# Patient Record
Sex: FEMALE | Race: WHITE | NOT HISPANIC OR LATINO | Employment: OTHER | URBAN - NONMETROPOLITAN AREA
[De-identification: names, ages, dates, MRNs, and addresses within clinical notes are randomized per-mention and may not be internally consistent; named-entity substitution may affect disease eponyms.]

---

## 2023-04-26 LAB
APPEARANCE, URINE: ABNORMAL
BACTERIA, URINE: ABNORMAL /HPF
BILIRUBIN, URINE: NEGATIVE
BLOOD, URINE: NEGATIVE
CALCIUM OXALATE CRYSTALS, URINE: ABNORMAL /HPF
COLOR, URINE: YELLOW
GLUCOSE, URINE: NEGATIVE MG/DL
KETONES, URINE: NEGATIVE MG/DL
LEUKOCYTE ESTERASE, URINE: ABNORMAL
MUCUS, URINE: ABNORMAL /LPF
NITRITE, URINE: NEGATIVE
PH, URINE: 6 (ref 5–8)
PROTEIN, URINE: NEGATIVE MG/DL
RBC, URINE: 2 /HPF (ref 0–5)
SPECIFIC GRAVITY, URINE: 1.02 (ref 1–1.03)
SQUAMOUS EPITHELIAL CELLS, URINE: 2 /HPF
UROBILINOGEN, URINE: <2 MG/DL (ref 0–1.9)
WBC CLUMPS, URINE: ABNORMAL /HPF
WBC, URINE: 24 /HPF (ref 0–5)

## 2023-06-21 LAB
APPEARANCE, URINE: CLEAR
BACTERIA, URINE: ABNORMAL /HPF
BILIRUBIN, URINE: NEGATIVE
BLOOD, URINE: NEGATIVE
COLOR, URINE: ABNORMAL
GLUCOSE, URINE: NEGATIVE MG/DL
KETONES, URINE: NEGATIVE MG/DL
LEUKOCYTE ESTERASE, URINE: NEGATIVE
NITRITE, URINE: POSITIVE
PH, URINE: 7 (ref 5–8)
PROTEIN, URINE: NEGATIVE MG/DL
RBC, URINE: 2 /HPF (ref 0–5)
SPECIFIC GRAVITY, URINE: 1.01 (ref 1–1.03)
SQUAMOUS EPITHELIAL CELLS, URINE: 2 /HPF
UROBILINOGEN, URINE: 4 MG/DL (ref 0–1.9)
WBC, URINE: 13 /HPF (ref 0–5)

## 2023-06-22 LAB — URINE CULTURE: NORMAL

## 2023-08-23 LAB
AMORPHOUS CRYSTALS, URINE: ABNORMAL /HPF
APPEARANCE, URINE: ABNORMAL
BACTERIA, URINE: ABNORMAL /HPF
BILIRUBIN, URINE: NEGATIVE
BLOOD, URINE: NEGATIVE
COLOR, URINE: YELLOW
GLUCOSE, URINE: NEGATIVE MG/DL
KETONES, URINE: NEGATIVE MG/DL
LEUKOCYTE ESTERASE, URINE: ABNORMAL
MUCUS, URINE: ABNORMAL /LPF
NITRITE, URINE: NEGATIVE
PH, URINE: 7 (ref 5–8)
PROTEIN, URINE: ABNORMAL MG/DL
RBC, URINE: 2 /HPF (ref 0–5)
SPECIFIC GRAVITY, URINE: 1.01 (ref 1–1.03)
SQUAMOUS EPITHELIAL CELLS, URINE: 1 /HPF
UROBILINOGEN, URINE: <2 MG/DL (ref 0–1.9)
WBC, URINE: ABNORMAL /HPF (ref 0–5)

## 2023-10-18 ENCOUNTER — TELEPHONE (OUTPATIENT)
Dept: HEMATOLOGY/ONCOLOGY | Facility: CLINIC | Age: 88
End: 2023-10-18

## 2023-10-18 ENCOUNTER — LAB (OUTPATIENT)
Dept: LAB | Facility: LAB | Age: 88
End: 2023-10-18
Payer: MEDICARE

## 2023-10-18 DIAGNOSIS — R30.0 DYSURIA: ICD-10-CM

## 2023-10-18 DIAGNOSIS — N39.0 ACUTE UTI: Primary | ICD-10-CM

## 2023-10-18 LAB
APPEARANCE UR: ABNORMAL
BACTERIA #/AREA URNS AUTO: ABNORMAL /HPF
BILIRUB UR STRIP.AUTO-MCNC: NEGATIVE MG/DL
CAOX CRY #/AREA UR COMP ASSIST: ABNORMAL /HPF
COLOR UR: ABNORMAL
GLUCOSE UR STRIP.AUTO-MCNC: NEGATIVE MG/DL
HYALINE CASTS #/AREA URNS AUTO: ABNORMAL /LPF
KETONES UR STRIP.AUTO-MCNC: NEGATIVE MG/DL
LEUKOCYTE ESTERASE UR QL STRIP.AUTO: ABNORMAL
MUCOUS THREADS #/AREA URNS AUTO: ABNORMAL /LPF
NITRITE UR QL STRIP.AUTO: POSITIVE
PH UR STRIP.AUTO: 5 [PH]
PROT UR STRIP.AUTO-MCNC: ABNORMAL MG/DL
RBC # UR STRIP.AUTO: NEGATIVE /UL
RBC #/AREA URNS AUTO: ABNORMAL /HPF
SP GR UR STRIP.AUTO: 1.02
SQUAMOUS #/AREA URNS AUTO: ABNORMAL /HPF
UROBILINOGEN UR STRIP.AUTO-MCNC: 4 MG/DL
WBC #/AREA URNS AUTO: >50 /HPF
WBC CLUMPS #/AREA URNS AUTO: ABNORMAL /HPF

## 2023-10-18 PROCEDURE — 81001 URINALYSIS AUTO W/SCOPE: CPT

## 2023-10-18 PROCEDURE — 87086 URINE CULTURE/COLONY COUNT: CPT

## 2023-10-18 PROCEDURE — 87186 SC STD MICRODIL/AGAR DIL: CPT

## 2023-10-19 RX ORDER — SULFAMETHOXAZOLE AND TRIMETHOPRIM 800; 160 MG/1; MG/1
1 TABLET ORAL 2 TIMES DAILY
Qty: 6 TABLET | Refills: 0 | COMMUNITY
Start: 2023-10-19 | End: 2023-10-19 | Stop reason: SINTOL

## 2023-10-19 RX ORDER — CEFDINIR 300 MG/1
300 CAPSULE ORAL 2 TIMES DAILY
Qty: 10 CAPSULE | Refills: 0 | Status: SHIPPED | OUTPATIENT
Start: 2023-10-19 | End: 2023-10-24

## 2023-10-21 LAB — BACTERIA UR CULT: ABNORMAL

## 2023-10-23 ENCOUNTER — TELEPHONE (OUTPATIENT)
Dept: HEMATOLOGY/ONCOLOGY | Facility: CLINIC | Age: 88
End: 2023-10-23
Payer: MEDICARE

## 2023-10-23 DIAGNOSIS — N39.0 RECURRENT UTI: Primary | ICD-10-CM

## 2023-10-23 RX ORDER — METHENAMINE HIPPURATE 1000 MG/1
1 TABLET ORAL 2 TIMES DAILY
Qty: 60 TABLET | Refills: 11 | Status: SHIPPED | OUTPATIENT
Start: 2023-10-23 | End: 2024-02-15 | Stop reason: ALTCHOICE

## 2023-11-13 RX ORDER — ALLOPURINOL 100 MG/1
1 TABLET ORAL DAILY
COMMUNITY

## 2023-11-13 RX ORDER — AMANTADINE HYDROCHLORIDE 100 MG/1
100 CAPSULE, GELATIN COATED ORAL
COMMUNITY
Start: 2018-03-16 | End: 2023-11-29 | Stop reason: ALTCHOICE

## 2023-11-13 RX ORDER — BUSPIRONE HYDROCHLORIDE 15 MG/1
1 TABLET ORAL 3 TIMES DAILY PRN
COMMUNITY
End: 2023-11-29 | Stop reason: ALTCHOICE

## 2023-11-13 RX ORDER — CALCIUM CARBONATE 600 MG
2 TABLET ORAL DAILY
COMMUNITY

## 2023-11-13 RX ORDER — DENOSUMAB 60 MG/ML
60 INJECTION SUBCUTANEOUS
COMMUNITY

## 2023-11-13 RX ORDER — ALENDRONATE SODIUM 70 MG/1
TABLET ORAL
COMMUNITY
Start: 2017-07-26 | End: 2023-11-29 | Stop reason: ALTCHOICE

## 2023-11-13 RX ORDER — CHOLECALCIFEROL (VITAMIN D3) 25 MCG
1000 TABLET ORAL
COMMUNITY

## 2023-11-13 RX ORDER — ACETAMINOPHEN 500 MG
1000 TABLET ORAL
COMMUNITY
Start: 2027-01-10

## 2023-11-13 RX ORDER — ASCORBIC ACID 250 MG
250 TABLET ORAL 2 TIMES DAILY
COMMUNITY
End: 2024-02-15 | Stop reason: ALTCHOICE

## 2023-11-13 RX ORDER — AMIODARONE HYDROCHLORIDE 200 MG/1
TABLET ORAL
COMMUNITY
Start: 2022-10-12

## 2023-11-13 RX ORDER — AMLODIPINE BESYLATE 5 MG/1
TABLET ORAL
COMMUNITY
Start: 2016-06-21

## 2023-11-13 RX ORDER — ESTRADIOL 0.1 MG/G
CREAM VAGINAL
COMMUNITY

## 2023-11-13 RX ORDER — ERGOCALCIFEROL 1.25 MG/1
CAPSULE ORAL
COMMUNITY
Start: 2020-02-03 | End: 2023-11-29 | Stop reason: ALTCHOICE

## 2023-11-13 RX ORDER — CLOPIDOGREL BISULFATE 75 MG/1
TABLET ORAL
COMMUNITY
Start: 2015-11-24

## 2023-11-13 RX ORDER — ASPIRIN 81 MG/1
TABLET ORAL
COMMUNITY
Start: 2020-06-30 | End: 2023-11-29 | Stop reason: ALTCHOICE

## 2023-11-13 RX ORDER — ATORVASTATIN CALCIUM 20 MG/1
TABLET, FILM COATED ORAL
COMMUNITY
Start: 2015-12-09

## 2023-11-13 RX ORDER — BUPROPION HYDROCHLORIDE 150 MG/1
1 TABLET ORAL DAILY
COMMUNITY

## 2023-11-13 RX ORDER — FAMOTIDINE 20 MG/1
1 TABLET, FILM COATED ORAL EVERY 12 HOURS
COMMUNITY
End: 2023-11-29 | Stop reason: ALTCHOICE

## 2023-11-13 RX ORDER — CARVEDILOL 3.12 MG/1
1 TABLET ORAL
COMMUNITY
Start: 2017-11-13 | End: 2023-11-29 | Stop reason: ALTCHOICE

## 2023-11-28 ENCOUNTER — APPOINTMENT (OUTPATIENT)
Dept: OBSTETRICS AND GYNECOLOGY | Facility: CLINIC | Age: 88
End: 2023-11-28
Payer: MEDICARE

## 2023-11-29 ENCOUNTER — APPOINTMENT (OUTPATIENT)
Dept: OBSTETRICS AND GYNECOLOGY | Facility: CLINIC | Age: 88
End: 2023-11-29
Payer: MEDICARE

## 2023-11-29 ENCOUNTER — OFFICE VISIT (OUTPATIENT)
Dept: OBSTETRICS AND GYNECOLOGY | Facility: CLINIC | Age: 88
End: 2023-11-29
Payer: MEDICARE

## 2023-11-29 VITALS
BODY MASS INDEX: 24.84 KG/M2 | WEIGHT: 135 LBS | SYSTOLIC BLOOD PRESSURE: 175 MMHG | HEIGHT: 62 IN | DIASTOLIC BLOOD PRESSURE: 71 MMHG | HEART RATE: 76 BPM | RESPIRATION RATE: 18 BRPM

## 2023-11-29 DIAGNOSIS — N39.0 RECURRENT UTI: ICD-10-CM

## 2023-11-29 DIAGNOSIS — N95.2 VAGINAL ATROPHY: Primary | ICD-10-CM

## 2023-11-29 PROCEDURE — 1159F MED LIST DOCD IN RCRD: CPT | Performed by: STUDENT IN AN ORGANIZED HEALTH CARE EDUCATION/TRAINING PROGRAM

## 2023-11-29 PROCEDURE — 99213 OFFICE O/P EST LOW 20 MIN: CPT | Performed by: STUDENT IN AN ORGANIZED HEALTH CARE EDUCATION/TRAINING PROGRAM

## 2023-11-29 RX ORDER — METOPROLOL TARTRATE 25 MG/1
25 TABLET, FILM COATED ORAL 2 TIMES DAILY
COMMUNITY
Start: 2023-11-28

## 2023-11-29 RX ORDER — LISINOPRIL 20 MG/1
20 TABLET ORAL 2 TIMES DAILY
COMMUNITY
Start: 2023-11-01 | End: 2024-10-31

## 2023-11-29 RX ORDER — ISOSORBIDE MONONITRATE 30 MG/1
TABLET, EXTENDED RELEASE ORAL
COMMUNITY
End: 2024-02-15 | Stop reason: ALTCHOICE

## 2023-11-29 NOTE — PROGRESS NOTES
HISTORY OF PRESENT ILLNESS:  Tracey Unger is a 89 y.o. female, who presents in follow up for Macarena, vaginal atrophy     During last encounter on 8/23/23, reviewed and agreed to the following:  Plan:   1. Recurrent UTI, vaginal atrophy   - No recent UTI since starting Hiprex 1 gm BID.  - Will discontinue Hiprex 1 gm BID and transition to E-string 2 mg ring to be removed and replaced with a new ring intravaginally q 3 months.  - Placed new Estring 2mg intravaginal ring today for UTI ppx.      2. Elevated blood pressure  - Patient has slightly elevated BP today at 157/75.  - Encouraged her to keep the follow up with PCP for HTN management. Reassured pt f/u with nephrologist does not necessarily mean there is something wrong with kidneys.     Since prior visit, had another UTI and opted to go back on Hiprex.    Today she reports   One UTI as above. Doing well otherwise.    The following were reviewed to gain additional history:  Test results:   Urine culture 10/18/23, >100k E coli, resistant to ampicillin  Urine culture 6/21/23 no growth          PHYSICAL EXAMINATION:  No LMP recorded.  There is no height or weight on file to calculate BMI.  There were no vitals taken for this visit.    General Appearance: well appearing  Neuro: Alert and oriented   HEENT: mucous membranes moist, neck supple  Resp: No respiratory distress, normal work of breathing  MSK: normal range of motion, gait appropriate    Pelvic:  Chaperone for pelvic exam:   Genitourinary:  normal external genitalia, Bartholin's glands, [place]'s glands negative,   Urethra normal meatus, non-tender, no periurethral mass  Vaginal mucosa  normal  Atrophy positive    Rectal: no hemorrhoids, fissures or masses.    PVR (by ultrasound): n/a  Urine dip:  No results found for this or any previous visit.     IMPRESSION AND PLAN:  Tracey Unger is a 89 y.o. who presents in follow up for vaginal atrophy, Macarena    Vaginal atrophy, Macarena  - continue e-string q 3  months  - next e-string exchange will be with Dr. Hernandez while I'm on maternity leave  - she would like to try discontinuing Hiprex again (last time was off Hiprex for 2 months and restarted after one UTI)  - Can try stopping Hiprex x 6 months and if more than 2 UTIs in that time, would resume Hiprex    All questions and concerns were answered and addressed.  The patient expressed understanding and agrees with the plan.     RTC 3 months for next estring replacement.     Herminia Latif MD

## 2024-01-03 ENCOUNTER — TELEPHONE (OUTPATIENT)
Dept: HEMATOLOGY/ONCOLOGY | Facility: CLINIC | Age: 89
End: 2024-01-03
Payer: MEDICARE

## 2024-01-03 NOTE — TELEPHONE ENCOUNTER
Brittney Oscar, Tracey's daughter, called office. The Estring that was previously covered by hier insurance at $90 for 3 months, is now $500 for 3 months. She has tried Estrogen cream in the past, but had great difficulty inserting it. Asking if there is any other alternative less expensive med. She also will call me back later today or tomorrow with insurance information to see if can do Prior Auth for Estring.

## 2024-02-14 ENCOUNTER — LAB (OUTPATIENT)
Dept: LAB | Facility: LAB | Age: 89
End: 2024-02-14
Payer: MEDICARE

## 2024-02-14 ENCOUNTER — TELEPHONE (OUTPATIENT)
Dept: HEMATOLOGY/ONCOLOGY | Facility: CLINIC | Age: 89
End: 2024-02-14
Payer: MEDICARE

## 2024-02-14 DIAGNOSIS — T78.40XA ALLERGY, INITIAL ENCOUNTER: Primary | ICD-10-CM

## 2024-02-14 DIAGNOSIS — N39.0 RECURRENT UTI: ICD-10-CM

## 2024-02-14 DIAGNOSIS — N39.0 RECURRENT UTI: Primary | ICD-10-CM

## 2024-02-14 LAB
APPEARANCE UR: ABNORMAL
BACTERIA #/AREA URNS AUTO: ABNORMAL /HPF
BILIRUB UR STRIP.AUTO-MCNC: NEGATIVE MG/DL
COLOR UR: ABNORMAL
GLUCOSE UR STRIP.AUTO-MCNC: NEGATIVE MG/DL
HOLD SPECIMEN: NORMAL
KETONES UR STRIP.AUTO-MCNC: NEGATIVE MG/DL
LEUKOCYTE ESTERASE UR QL STRIP.AUTO: NEGATIVE
MUCOUS THREADS #/AREA URNS AUTO: ABNORMAL /LPF
NITRITE UR QL STRIP.AUTO: POSITIVE
PH UR STRIP.AUTO: 7 [PH]
PROT UR STRIP.AUTO-MCNC: ABNORMAL MG/DL
RBC # UR STRIP.AUTO: NEGATIVE /UL
RBC #/AREA URNS AUTO: ABNORMAL /HPF
SP GR UR STRIP.AUTO: 1.01
SQUAMOUS #/AREA URNS AUTO: ABNORMAL /HPF
TRI-PHOS CRY #/AREA UR COMP ASSIST: ABNORMAL /HPF
UROBILINOGEN UR STRIP.AUTO-MCNC: 4 MG/DL
WBC #/AREA URNS AUTO: >50 /HPF
WBC CLUMPS #/AREA URNS AUTO: ABNORMAL /HPF

## 2024-02-14 PROCEDURE — 87181 SC STD AGAR DILUTION PER AGT: CPT

## 2024-02-14 PROCEDURE — 87186 SC STD MICRODIL/AGAR DIL: CPT

## 2024-02-14 PROCEDURE — 87086 URINE CULTURE/COLONY COUNT: CPT

## 2024-02-14 PROCEDURE — 81001 URINALYSIS AUTO W/SCOPE: CPT

## 2024-02-14 NOTE — TELEPHONE ENCOUNTER
Tracey's daughter, Brittney , called office. Since yesterday C/O pain with urination and pressure. She had pneumonia 2 weeks ago, and has been in bed a lot and wearing depends since was unable to make it to restroom. She does have appt tomorrow with Dr. Hernandez to have E-string removed, but asking if can have urine checked today. She does have Macrobid on hand, and asking if OK to start that after submitting urine sample. Lab order placed and will take to lab.

## 2024-02-15 ENCOUNTER — OFFICE VISIT (OUTPATIENT)
Dept: OBSTETRICS AND GYNECOLOGY | Facility: CLINIC | Age: 89
End: 2024-02-15
Payer: MEDICARE

## 2024-02-15 VITALS — DIASTOLIC BLOOD PRESSURE: 82 MMHG | HEART RATE: 80 BPM | SYSTOLIC BLOOD PRESSURE: 149 MMHG | RESPIRATION RATE: 18 BRPM

## 2024-02-15 DIAGNOSIS — N39.41 URGE URINARY INCONTINENCE: ICD-10-CM

## 2024-02-15 DIAGNOSIS — N39.0 RECURRENT UTI: Primary | ICD-10-CM

## 2024-02-15 PROCEDURE — 1126F AMNT PAIN NOTED NONE PRSNT: CPT | Performed by: OBSTETRICS & GYNECOLOGY

## 2024-02-15 PROCEDURE — 1159F MED LIST DOCD IN RCRD: CPT | Performed by: OBSTETRICS & GYNECOLOGY

## 2024-02-15 PROCEDURE — 1157F ADVNC CARE PLAN IN RCRD: CPT | Performed by: OBSTETRICS & GYNECOLOGY

## 2024-02-15 PROCEDURE — 99214 OFFICE O/P EST MOD 30 MIN: CPT | Performed by: OBSTETRICS & GYNECOLOGY

## 2024-02-15 RX ORDER — VENLAFAXINE HYDROCHLORIDE 150 MG/1
150 CAPSULE, EXTENDED RELEASE ORAL DAILY
COMMUNITY

## 2024-02-15 RX ORDER — VENLAFAXINE 37.5 MG/1
37.5 TABLET ORAL DAILY
COMMUNITY

## 2024-02-15 RX ORDER — PANTOPRAZOLE SODIUM 40 MG/1
40 TABLET, DELAYED RELEASE ORAL
COMMUNITY

## 2024-02-15 RX ORDER — NITROFURANTOIN 25; 75 MG/1; MG/1
100 CAPSULE ORAL 2 TIMES DAILY
Qty: 10 CAPSULE | Refills: 0 | Status: SHIPPED | OUTPATIENT
Start: 2024-02-15 | End: 2024-02-20

## 2024-02-15 RX ORDER — METHENAMINE HIPPURATE 1000 MG/1
1 TABLET ORAL 2 TIMES DAILY
Qty: 60 TABLET | Refills: 11 | Status: SHIPPED | OUTPATIENT
Start: 2024-02-15 | End: 2025-02-14

## 2024-02-15 RX ORDER — SOLRIAMFETOL 75 MG/1
TABLET, FILM COATED ORAL
COMMUNITY

## 2024-02-15 ASSESSMENT — ENCOUNTER SYMPTOMS
GASTROINTESTINAL NEGATIVE: 1
CONSTITUTIONAL NEGATIVE: 1
PSYCHIATRIC NEGATIVE: 1
CARDIOVASCULAR NEGATIVE: 1
EYES NEGATIVE: 1
MUSCULOSKELETAL NEGATIVE: 1
NEUROLOGICAL NEGATIVE: 1
RESPIRATORY NEGATIVE: 1
ENDOCRINE NEGATIVE: 1

## 2024-02-15 ASSESSMENT — PAIN SCALES - GENERAL: PAINLEVEL: 0-NO PAIN

## 2024-02-16 RX ORDER — CIPROFLOXACIN 500 MG/1
500 TABLET ORAL 2 TIMES DAILY
Qty: 10 TABLET | Refills: 0 | Status: SHIPPED | OUTPATIENT
Start: 2024-02-16 | End: 2024-02-21

## 2024-02-16 RX ORDER — GRANULES FOR ORAL 3 G/1
3 POWDER ORAL ONCE
Qty: 3 G | Refills: 0 | Status: SHIPPED | OUTPATIENT
Start: 2024-02-16 | End: 2024-02-16 | Stop reason: ALTCHOICE

## 2024-02-16 NOTE — PROGRESS NOTES
[facility] Department of Urogynecology   Dottie Hernandez MD, MPH   [phone]    ASSESSMENT AND PLAN:   89 year old female with recurrent UTI and vaginal atrophy. Comorbidities include: HTN, SCOTT, and GERD.     Diagnoses:  #1 Recurrent UTI  #2 Vaginal atrophy    Plan:  1. Macarena, vaginal atrophy  - PVR = 75mL by bladder U/S and urine culture pending from 2/14/2024 showed >100,000 CFU/ML Gram Negative Bacilli.   - Sent Rx of Macrobid 100mg with instructions to take 1 pill po BID x5 days for acute UTI.   - Patient was unable to  Estring due to this not being available/stocked at the pharmacy. Removed old Estring today and discarded it.   - She plans to use tv Estradiol cream 2x/week for UTI ppx moving forward and we provided her with education on how to apply the E2 today.   - Placed standing urine culture orders for this patient today to leave a urine sample at any  lab if she feels symptomatic of a UTI in the future.   - She is interested in restarting Hiprex for UTI ppx.  - Sent Rx of Methenamine 1g to her preferred pharmacy with instructions to take 1 pill po BID take with 500mg of vitamin C for UTI prophylaxis. This medication works to make the urine more acidic to prevent bacterial infections/UTI. We will plan to have her on this regimen to break the cycle of recurrent UTI for 6-12 months then re-evaluate if we should continue of discontinue Hiprex.   **OVERALL PLAN: Restart Hiprex BID and using E2 cream 2x/week for UTI ppx.     Follow up in 3 months with Dr. Herminia Latif.     Leny Attestation  By signing my name below, Mervin ADEN Scribe, attest that this documentation has been prepared under the direction and in the presence of Dottie Hernandez MD MPH on 02/15/2024 at 8:22 PM.     Problem List Items Addressed This Visit    None  Visit Diagnoses       Recurrent UTI    -  Primary    Relevant Medications    methenamine hippurate (Hiprex) 1 gram tablet    nitrofurantoin,  macrocrystal-monohydrate, (Macrobid) 100 mg capsule    Other Relevant Orders    Urine Culture    Urge urinary incontinence        Relevant Orders    Measure post void residual (Completed)           I spent a total of 40 minutes in face to face and non face to face time.      Dottie Hernandez MD, MPH, FACOG     Reviewed urine culture- nitrofurantoin not appropriate. Spoke with pt's daughter, Jessica, who states she can take cipro and has taken it recently but needs to have an EKG when it's completed. Rx for cipro sent to pharmacy and Jessica with get her EKG completed next week with her cardiologist.   All questions answered  Referral placed to allergy and immunology for allergy testing.     I Dr. Hernandez, personally performed the services described in the documentation as scribed in my presence and confirm it is both complete and accurate.  Dottie Hernandez MD, MPH, FACOG      Established    HISTORY OF PRESENT ILLNESS:   89 year old female following up on recurrent UTI and for Estring replacement.     Record Review:   - 11/29/2023 Dr. Herminia Latif note reviewed: Macarena - No recent UTI. Transitioned her to use Estring for UTI ppx. Elevated blood pressure - Advised to follow up with her PCP.   - 2/10/2022 Dr. Hernandez note: Macarena - Amenable to starting Hiprex with vitamin C supplement for Macarena ppx.   - 2/14/2024 Urine culture: >100,000 CFU/ML Gram Negative Bacilli  - 10/18/2023 Urine culture: >100,000 CFU/ML Escherichia coli. Resistant to Ampicillin.   - 6/21/2023 Urine culture: Negative   -1/31/2022 Urine culture: Isolated E. Coli  -10/14/2021 Urine culture: No significant growth  -6/10/2021 Urine culture: No significant growth   - 3/31/2021 Urine culture: No significant growth  -3/22/2021 Urine culture: Pseudomonas aeruginosa  -11/19/2020 Urine culture: No significant growth    Urinary Symptoms:   - She felt symptomatic of a UTI and left a urine sample for culture yesterday. She had leftover Macrobid and has been taking this  since yesterday - this has improved her UTI symptoms as she does not endorse dysuria or bladder pain anymore since starting the Macrobid.   - Patient is no longer taking Hiprex for UTI ppx and was switched to E2.   - She was unable to  Estring due to this not being available/cost-prohibitive at the pharmacy.   - She is interested in restarting Hiprex for UTI ppx.  - Endorses urinary hesitancy occasionally and urinary frequency with urgency.     Past Medical History:     Past Medical History:   Diagnosis Date    Osteoarthritis of knee, unspecified     DJD (degenerative joint disease) of knee    Personal history of other diseases of the digestive system     History of diverticulitis of colon    Personal history of other diseases of the musculoskeletal system and connective tissue     History of osteoporosis    Personal history of other endocrine, nutritional and metabolic disease     History of hypercholesterolemia    Personal history of other mental and behavioral disorders     History of anxiety    Personal history of other specified conditions     History of dysuria    Personal history of other specified conditions     History of nocturia    Personal history of tuberculosis     History of tuberculosis    Stress incontinence (female) (male)     RYAN (stress urinary incontinence, female)        Past Surgical History:     Past Surgical History:   Procedure Laterality Date    OTHER SURGICAL HISTORY  03/29/2019    Knee replacement    OTHER SURGICAL HISTORY  03/29/2019    Mastectomy    OTHER SURGICAL HISTORY  03/29/2019    Small bowel resection    OTHER SURGICAL HISTORY  03/29/2019    Varicose vein ligation    OTHER SURGICAL HISTORY  03/29/2019    Tonsillectomy       Medications:     Prior to Admission medications    Medication Sig Start Date End Date Taking? Authorizing Provider   acetaminophen (Tylenol) 500 mg tablet Take 2 tablets (1,000 mg) by mouth. Do not start before January 10, 2027. 1/10/27  Yes Historical  Provider, MD   allopurinol (Zyloprim) 100 mg tablet Take 1 tablet (100 mg) by mouth once daily.   Yes Historical Provider, MD   amiodarone (Pacerone) 200 mg tablet take 0.5 tablet (100mg total daily 10/12/22  Yes Historical Provider, MD   amLODIPine (Norvasc) 5 mg tablet take 1 tablet by oral route 2 times a day 6/21/16  Yes Historical Provider, MD   atorvastatin (Lipitor) 20 mg tablet  12/9/15  Yes Historical Provider, MD   buPROPion XL (Wellbutrin XL) 150 mg 24 hr tablet Take 1 tablet (150 mg) by mouth once daily.   Yes Historical Provider, MD   calcium carbonate 600 mg calcium (1,500 mg) tablet Take 2 tablets (3,000 mg) by mouth once daily.   Yes Historical Provider, MD   cholecalciferol (Vitamin D-3) 25 MCG (1000 UT) tablet Take 1 tablet (1,000 Units) by mouth once daily.   Yes Historical Provider, MD   clopidogrel (Plavix) 75 mg tablet  11/24/15  Yes Historical Provider, MD   denosumab (Prolia) 60 mg/mL syringe Inject 1 mL (60 mg) under the skin.   Yes Historical Provider, MD   estradiol (Estrace) 0.01 % (0.1 mg/gram) vaginal cream use A PEA SIZED AMOUNT IN THE VAGINA TWICE A WEEK   Yes Historical Provider, MD   lisinopril 20 mg tablet Take 1 tablet (20 mg) by mouth twice a day. 11/1/23 10/31/24 Yes Historical Provider, MD   metoprolol tartrate (Lopressor) 25 mg tablet Take 1 tablet (25 mg) by mouth twice a day. 11/28/23  Yes Historical Provider, MD   ascorbic acid (Vitamin C) 250 mg tablet Take 1 tablet (250 mg) by mouth twice a day.  2/15/24 Yes Historical Provider, MD   methenamine hippurate (Hiprex) 1 gram tablet Take 1 tablet (1 g) by mouth 2 times a day. 2/15/24 2/14/25  Dottie Hernandez MD MPH   nitrofurantoin, macrocrystal-monohydrate, (Macrobid) 100 mg capsule Take 1 capsule (100 mg) by mouth 2 times a day for 5 days. 2/15/24 2/20/24  Dottie Hernandez MD MPH   pantoprazole (ProtoNix) 40 mg EC tablet Take 1 tablet (40 mg) by mouth once daily in the morning. Take before meals. Do not crush, chew, or  split.    Historical Provider, MD   solriamfetoL (Sunosi) 75 mg tablet Take by mouth. 1 tab every other day an 1/2 tab other days    Historical Provider, MD   venlafaxine (Effexor) 37.5 mg tablet Take 1 tablet (37.5 mg) by mouth once daily.    Historical Provider, MD   venlafaxine XR (Effexor-XR) 150 mg 24 hr capsule Take 1 capsule (150 mg) by mouth once daily. Do not crush or chew.    Historical Provider, MD   estradiol (Estring) 2 mg (7.5 mcg /24 hour) vaginal ring Insert 2 mg into the vagina every 3 months.  2/15/24  Historical Provider, MD   isosorbide mononitrate ER (Imdur) 30 mg 24 hr tablet   2/15/24  Historical Provider, MD   methenamine hippurate (Hiprex) 1 gram tablet Take 1 tablet (1 g) by mouth 2 times a day. 10/23/23 2/15/24  MD KAREN Naranjo  Review of Systems   Constitutional: Negative.    HENT: Negative.     Eyes: Negative.    Respiratory: Negative.     Cardiovascular: Negative.    Gastrointestinal: Negative.    Endocrine: Negative.    Genitourinary: Negative.    Musculoskeletal: Negative.    Neurological: Negative.    Psychiatric/Behavioral: Negative.            PHYSICAL EXAM:    /82 (BP Location: Right arm, Patient Position: Sitting, BP Cuff Size: Adult)   Pulse 80   Resp 18       Declines chaperone for physical exam.      Well developed, well nourished, in no apparent distress.   Neurologic/Psychiatric:  Awake, Alert and Oriented times 3.  Affect normal.     GENITAL/URINARY:     External Genitalia:  The patient has normal appearing external genitalia, normal skenes and bartholins glands, and a normal hair distribution.  Her vulva is without lesions, erythema or discharge.  It is non-tender with appropriate sensation.     Urethral Meatus:  Size normal, Location normal, Lesions absent, Prolapse absent.    Urethra:  Fullness absent, Masses absent.    Bladder:  Fullness absent, Masses absent, Tenderness absent.    Vagina:  General appearance normal, Discharge absent, Lesions  absent. Removed old Estring today and discarded it.  Normal vaginal epithelium.         Data and DIAGNOSTIC STUDIES REVIEWED   CT head wo IV contrast    Result Date: 1/31/2024  EXAMINATION: CT HEAD OR BRAIN WITHOUT CONTRAST, 1/31/2024 COMPARISON: CT scan of the head, 07/19/2023. HISTORY:  Injury/Trauma or Illness?:Illness/Other How long have you had these symptoms (acute/chronic)?:Acute cough headache TECHNIQUE: 2.5 mm axial images performed through the head.  2.3 mm axial, sagittal and coronal MPR reconstructions performed. Dose reduction techniques were achieved by using automated exposure control and/or adjustment of mA and/or kV according to patient size and/or use of iterative reconstruction technique. FINDINGS: Age-related cerebral and cerebellar atrophy with associated ventricle prominence.  Nonspecific periventricular white matter low attenuation, likely a sequela of small vessel disease.  There is no evidence of acute hemorrhage, mass effect or midline shift.  Complete opacification of the right maxillary sinus with some scattered areas of hyperdensity and surrounding periosteal reaction.  Remaining paranasal sinuses are clear.  Mastoid air cells are clear.  No acute osseous abnormality.    1. No acute intracranial abnormality identified. 2. Age-related atrophy with associated ventricular prominence and extensive periventricular white matter small vessel disease. 3. Complete opacification of the right maxillary sinus with some areas of hyperdensity likely corresponding to some inspissated mucus from chronic sinusitis.  A superimposed fungal infection or acute sinusitis would be difficult to exclude. [employer] Workstation ID:   [id number]    XR chest 1 view    Result Date: 1/31/2024  EXAMINATION: XR CHEST PA/AP HISTORY: ORDERING SYSTEM PROVIDED HISTORY: Cough. TECHNOLOGIST PROVIDED HISTORY: Illness/Other. Reason for exam: Cough 1 wk. Cancer History: Un. Surgery, Radiation History: Un. Encounter Type: Initial.  Additional signs and symptoms: Difficulty sleeping, headache 1 wk. COMPARISON: 05/12/2023. FINDINGS: One-view chest x-ray. Patient is significantly rotated to the left.  No pneumothorax.  Right midlung subsegmental infiltrate with fissural thickening.  Moderate left basilar ground-glass opacities.  No significant pleural effusions.  Normal heart size.  Lynx device in place at the gastroesophageal junction.    Infiltrates at the left lung base and right midlung zone, suspicious for multifocal pneumonia.  Recommend short-term radiographic followup to ensure resolution. [employer] Workstation ID:   [id number]     Lab Results   Component Value Date    URINECULTURE >100,000 Gram Negative Bacilli (A) 02/14/2024      Lab Results   Component Value Date    K 4.6 05/07/2020

## 2024-02-17 LAB — BACTERIA UR CULT: ABNORMAL

## 2024-02-23 ENCOUNTER — TELEPHONE (OUTPATIENT)
Dept: HEMATOLOGY/ONCOLOGY | Facility: CLINIC | Age: 89
End: 2024-02-23
Payer: MEDICARE

## 2024-02-23 NOTE — TELEPHONE ENCOUNTER
Tracey's daughters have spoken and decided that they really don't want to put their mom through the allergy testing. She has taken Cipro many times before after an EKG is done, and never any effects on her heart. They will have her do the EKG today.

## 2024-05-29 ENCOUNTER — TELEMEDICINE (OUTPATIENT)
Dept: OBSTETRICS AND GYNECOLOGY | Facility: CLINIC | Age: 89
End: 2024-05-29
Payer: MEDICARE

## 2024-05-29 DIAGNOSIS — N39.0 RECURRENT UTI: Primary | ICD-10-CM

## 2024-05-29 PROCEDURE — 1159F MED LIST DOCD IN RCRD: CPT | Performed by: STUDENT IN AN ORGANIZED HEALTH CARE EDUCATION/TRAINING PROGRAM

## 2024-05-29 PROCEDURE — 99442 PR PHYS/QHP TELEPHONE EVALUATION 11-20 MIN: CPT | Performed by: STUDENT IN AN ORGANIZED HEALTH CARE EDUCATION/TRAINING PROGRAM

## 2024-05-29 PROCEDURE — 1157F ADVNC CARE PLAN IN RCRD: CPT | Performed by: STUDENT IN AN ORGANIZED HEALTH CARE EDUCATION/TRAINING PROGRAM

## 2024-05-29 RX ORDER — ALUMINUM ZIRCONIUM OCTACHLOROHYDREX GLY 16 G/100G
GEL TOPICAL
COMMUNITY

## 2024-06-04 ENCOUNTER — LAB (OUTPATIENT)
Dept: LAB | Facility: LAB | Age: 89
End: 2024-06-04
Payer: MEDICARE

## 2024-06-04 DIAGNOSIS — N39.0 RECURRENT UTI: ICD-10-CM

## 2024-06-04 LAB
APPEARANCE UR: CLEAR
BILIRUB UR STRIP.AUTO-MCNC: NEGATIVE MG/DL
COLOR UR: NORMAL
GLUCOSE UR STRIP.AUTO-MCNC: NORMAL MG/DL
KETONES UR STRIP.AUTO-MCNC: NEGATIVE MG/DL
LEUKOCYTE ESTERASE UR QL STRIP.AUTO: NEGATIVE
NITRITE UR QL STRIP.AUTO: NEGATIVE
PH UR STRIP.AUTO: 6.5 [PH]
PROT UR STRIP.AUTO-MCNC: NEGATIVE MG/DL
RBC # UR STRIP.AUTO: NEGATIVE /UL
SP GR UR STRIP.AUTO: 1.01
UROBILINOGEN UR STRIP.AUTO-MCNC: NORMAL MG/DL

## 2024-06-04 PROCEDURE — 87086 URINE CULTURE/COLONY COUNT: CPT

## 2024-06-04 PROCEDURE — 81003 URINALYSIS AUTO W/O SCOPE: CPT

## 2024-06-06 LAB — BACTERIA UR CULT: NORMAL

## 2024-08-21 NOTE — PROGRESS NOTES
HISTORY OF PRESENT ILLNESS:  Tracey Unger is a 89 y.o. female, who presents in follow up for aMcarena, OAB vs anxiety     During last encounter on 24, reviewed and agreed to the followin y/o with Macarena, vaginal atrophy, now with urinary frequency.     Macarena  - reassured completely negative urinalysis recently  - started macrobid empirically  - reviewed ok to continue course until culture results (and due to anxiety below, ok to complete macrobid course if that reassures her)     OAB vs. Anxiety  - discussed unlikely to be sudden onset OAB  - more likely baseline anxiety is causing bladder concerns, difficulty sleeping, which can be a negative feedback loop  - reviewed this all with Brittney and discussed could try OAB medication if she desires  - not a candidate for myrbetriq (poorly controlled on multiple antihypertensives)  - rx for gemtesa sent to preferred pharmacy to check cost  - discussed trospium as alternative but has side effect profile which would not be ideal particularly given anxiety as overarching cause of current bladder symptoms  - Brittney will do her best to reassure Tracey that there are no current bladder/kidney concerns from a medical standpoint    Today she reports   Doing well, no UTIs. Did not start gemtesa because bladder was fine within a day.    Using estrogen cream    The following were reviewed to gain additional history:  External notes: urgent care visit 24 for fall  Test results: urinalysis [facility] 24, negative nitrites and negative blood          PHYSICAL EXAMINATION:  No LMP recorded.  There is no height or weight on file to calculate BMI.  There were no vitals taken for this visit.    General Appearance: well appearing  Neuro: Alert and oriented   HEENT: mucous membranes moist, neck supple  Resp: No respiratory distress, normal work of breathing  MSK: normal range of motion, gait appropriate    Pelvic: deferred    IMPRESSION AND PLAN:  Tracey Unger is a 89  yPaolao. who presents in follow up for Macarena, possible OAB versus anxiety component    Macraena, vaginal atrophy  - continue methenamine and vaginal estrogen  - refill for estrogen provided today  - previously tried discontinuing methenamine but UTI recurred so will continue as she has been taking  - standing order placed for urine culture as this is easier for them on weekends, etc    RTC 6 months    All questions and concerns were answered and addressed.  The patient expressed understanding and agrees with the plan.     Herminia Latif MD

## 2024-08-22 ENCOUNTER — APPOINTMENT (OUTPATIENT)
Dept: OBSTETRICS AND GYNECOLOGY | Facility: CLINIC | Age: 89
End: 2024-08-22
Payer: MEDICARE

## 2024-08-22 VITALS
SYSTOLIC BLOOD PRESSURE: 183 MMHG | OXYGEN SATURATION: 95 % | WEIGHT: 143.2 LBS | DIASTOLIC BLOOD PRESSURE: 74 MMHG | RESPIRATION RATE: 16 BRPM | BODY MASS INDEX: 26.18 KG/M2 | HEART RATE: 67 BPM | TEMPERATURE: 97.2 F

## 2024-08-22 DIAGNOSIS — N39.0 RECURRENT UTI: ICD-10-CM

## 2024-08-22 DIAGNOSIS — N95.2 VAGINAL ATROPHY: Primary | ICD-10-CM

## 2024-08-22 PROCEDURE — 99214 OFFICE O/P EST MOD 30 MIN: CPT | Performed by: STUDENT IN AN ORGANIZED HEALTH CARE EDUCATION/TRAINING PROGRAM

## 2024-08-22 PROCEDURE — 1126F AMNT PAIN NOTED NONE PRSNT: CPT | Performed by: STUDENT IN AN ORGANIZED HEALTH CARE EDUCATION/TRAINING PROGRAM

## 2024-08-22 PROCEDURE — 1157F ADVNC CARE PLAN IN RCRD: CPT | Performed by: STUDENT IN AN ORGANIZED HEALTH CARE EDUCATION/TRAINING PROGRAM

## 2024-08-22 PROCEDURE — 1036F TOBACCO NON-USER: CPT | Performed by: STUDENT IN AN ORGANIZED HEALTH CARE EDUCATION/TRAINING PROGRAM

## 2024-08-22 PROCEDURE — 1159F MED LIST DOCD IN RCRD: CPT | Performed by: STUDENT IN AN ORGANIZED HEALTH CARE EDUCATION/TRAINING PROGRAM

## 2024-08-22 RX ORDER — ESTRADIOL 0.1 MG/G
CREAM VAGINAL
Qty: 42.5 G | Refills: 2 | Status: SHIPPED | OUTPATIENT
Start: 2024-08-22

## 2024-08-22 ASSESSMENT — PAIN SCALES - GENERAL: PAINLEVEL: 0-NO PAIN

## 2024-10-03 ENCOUNTER — LAB (OUTPATIENT)
Dept: LAB | Facility: LAB | Age: 89
End: 2024-10-03
Payer: MEDICARE

## 2024-10-03 ENCOUNTER — TELEPHONE (OUTPATIENT)
Dept: HEMATOLOGY/ONCOLOGY | Facility: CLINIC | Age: 89
End: 2024-10-03
Payer: MEDICARE

## 2024-10-03 ENCOUNTER — TELEPHONE (OUTPATIENT)
Dept: HEMATOLOGY/ONCOLOGY | Facility: CLINIC | Age: 89
End: 2024-10-03

## 2024-10-03 DIAGNOSIS — N39.0 RECURRENT UTI: ICD-10-CM

## 2024-10-03 LAB
APPEARANCE UR: CLEAR
BACTERIA #/AREA URNS AUTO: ABNORMAL /HPF
BILIRUB UR STRIP.AUTO-MCNC: ABNORMAL MG/DL
COLOR UR: ABNORMAL
GLUCOSE UR STRIP.AUTO-MCNC: NORMAL MG/DL
HOLD SPECIMEN: NORMAL
KETONES UR STRIP.AUTO-MCNC: NEGATIVE MG/DL
LEUKOCYTE ESTERASE UR QL STRIP.AUTO: ABNORMAL
NITRITE UR QL STRIP.AUTO: ABNORMAL
PH UR STRIP.AUTO: 7.5 [PH]
PROT UR STRIP.AUTO-MCNC: NEGATIVE MG/DL
RBC # UR STRIP.AUTO: NEGATIVE /UL
RBC #/AREA URNS AUTO: ABNORMAL /HPF
SP GR UR STRIP.AUTO: 1.01
SQUAMOUS #/AREA URNS AUTO: ABNORMAL /HPF
UROBILINOGEN UR STRIP.AUTO-MCNC: NORMAL MG/DL
WBC #/AREA URNS AUTO: ABNORMAL /HPF
WBC CLUMPS #/AREA URNS AUTO: ABNORMAL /HPF

## 2024-10-03 PROCEDURE — 81001 URINALYSIS AUTO W/SCOPE: CPT

## 2024-10-03 PROCEDURE — 87086 URINE CULTURE/COLONY COUNT: CPT

## 2024-10-03 NOTE — TELEPHONE ENCOUNTER
Brittney called back, informed that urinalysis shows bacteria, nitrite positive , so will start Macrobid until culture is back, as they have done in the past.

## 2024-10-03 NOTE — TELEPHONE ENCOUNTER
Patient's daughter, Brittney called office. Tracey is c/o urinary urgency since yesterday. Has Macrobid on hand. Order placed for urinalysis/C+S. She will bring urine sample to lab this morning.

## 2024-10-06 LAB — BACTERIA UR CULT: NORMAL

## 2024-10-07 ENCOUNTER — TELEPHONE (OUTPATIENT)
Dept: HEMATOLOGY/ONCOLOGY | Facility: CLINIC | Age: 89
End: 2024-10-07
Payer: MEDICARE

## 2024-10-07 DIAGNOSIS — N39.0 ACUTE UTI: Primary | ICD-10-CM

## 2024-10-07 RX ORDER — NITROFURANTOIN 25; 75 MG/1; MG/1
100 CAPSULE ORAL 2 TIMES DAILY
Qty: 10 CAPSULE | Refills: 2 | Status: SHIPPED | OUTPATIENT
Start: 2024-10-07 | End: 2024-10-12

## 2024-10-07 NOTE — TELEPHONE ENCOUNTER
Patient's daughter, Brittney, notified of Urine culture result of negative/no significant growth. She had started patient on Macrobid on 10/3 when symptoms started and will now stop medication. She is [facility] future use/need.

## 2024-10-25 ENCOUNTER — LAB (OUTPATIENT)
Dept: LAB | Facility: LAB | Age: 89
End: 2024-10-25
Payer: MEDICARE

## 2024-10-25 ENCOUNTER — TELEPHONE (OUTPATIENT)
Dept: NEUROLOGY | Facility: CLINIC | Age: 89
End: 2024-10-25

## 2024-10-25 DIAGNOSIS — R30.0 DYSURIA: ICD-10-CM

## 2024-10-25 LAB
APPEARANCE UR: ABNORMAL
BACTERIA #/AREA URNS AUTO: ABNORMAL /HPF
BILIRUB UR STRIP.AUTO-MCNC: ABNORMAL MG/DL
COLOR UR: ABNORMAL
GLUCOSE UR STRIP.AUTO-MCNC: NORMAL MG/DL
KETONES UR STRIP.AUTO-MCNC: NEGATIVE MG/DL
LEUKOCYTE ESTERASE UR QL STRIP.AUTO: ABNORMAL
NITRITE UR QL STRIP.AUTO: ABNORMAL
PH UR STRIP.AUTO: 6 [PH]
PROT UR STRIP.AUTO-MCNC: ABNORMAL MG/DL
RBC # UR STRIP.AUTO: ABNORMAL /UL
RBC #/AREA URNS AUTO: ABNORMAL /HPF
SP GR UR STRIP.AUTO: 1.03
SQUAMOUS #/AREA URNS AUTO: ABNORMAL /HPF
UROBILINOGEN UR STRIP.AUTO-MCNC: ABNORMAL MG/DL
WBC #/AREA URNS AUTO: ABNORMAL /HPF
YEAST BUDDING #/AREA UR COMP ASSIST: PRESENT /HPF

## 2024-10-25 PROCEDURE — 81001 URINALYSIS AUTO W/SCOPE: CPT

## 2024-10-25 PROCEDURE — 87186 SC STD MICRODIL/AGAR DIL: CPT

## 2024-10-25 PROCEDURE — 87086 URINE CULTURE/COLONY COUNT: CPT

## 2024-10-26 LAB — HOLD SPECIMEN: NORMAL

## 2024-10-27 LAB — BACTERIA UR CULT: ABNORMAL

## 2024-10-28 LAB — BACTERIA UR CULT: ABNORMAL

## 2024-12-10 ENCOUNTER — LAB (OUTPATIENT)
Dept: LAB | Facility: LAB | Age: 89
End: 2024-12-10
Payer: MEDICARE

## 2024-12-10 ENCOUNTER — TELEPHONE (OUTPATIENT)
Dept: HEMATOLOGY/ONCOLOGY | Facility: CLINIC | Age: 89
End: 2024-12-10

## 2024-12-10 DIAGNOSIS — N39.0 RECURRENT UTI: ICD-10-CM

## 2024-12-10 LAB
APPEARANCE UR: ABNORMAL
BACTERIA #/AREA URNS AUTO: ABNORMAL /HPF
BILIRUB UR STRIP.AUTO-MCNC: NEGATIVE MG/DL
CAOX CRY #/AREA UR COMP ASSIST: ABNORMAL /HPF
COLOR UR: ABNORMAL
GLUCOSE UR STRIP.AUTO-MCNC: NORMAL MG/DL
HOLD SPECIMEN: NORMAL
KETONES UR STRIP.AUTO-MCNC: NEGATIVE MG/DL
LEUKOCYTE ESTERASE UR QL STRIP.AUTO: ABNORMAL
MUCOUS THREADS #/AREA URNS AUTO: ABNORMAL /LPF
NITRITE UR QL STRIP.AUTO: ABNORMAL
PH UR STRIP.AUTO: 6.5 [PH]
PROT UR STRIP.AUTO-MCNC: NEGATIVE MG/DL
RBC # UR STRIP.AUTO: NEGATIVE /UL
RBC #/AREA URNS AUTO: ABNORMAL /HPF
SP GR UR STRIP.AUTO: 1.01
SQUAMOUS #/AREA URNS AUTO: ABNORMAL /HPF
UROBILINOGEN UR STRIP.AUTO-MCNC: NORMAL MG/DL
WBC #/AREA URNS AUTO: >50 /HPF

## 2024-12-10 PROCEDURE — 81001 URINALYSIS AUTO W/SCOPE: CPT

## 2024-12-10 PROCEDURE — 87186 SC STD MICRODIL/AGAR DIL: CPT

## 2024-12-10 PROCEDURE — 87086 URINE CULTURE/COLONY COUNT: CPT

## 2024-12-10 NOTE — TELEPHONE ENCOUNTER
Patient started Macrobid, and urised after submitting urine. But having a lot of pelvic discomfort and asking if anything else they can do until culture is finished.

## 2024-12-13 ENCOUNTER — TELEPHONE (OUTPATIENT)
Dept: NEUROLOGY | Facility: CLINIC | Age: 89
End: 2024-12-13
Payer: MEDICARE

## 2024-12-13 LAB — BACTERIA UR CULT: ABNORMAL

## 2024-12-13 NOTE — TELEPHONE ENCOUNTER
Daughter calling for results of urine C+S. On Macrobid bid since Tuesday. Reports feeling better, is very tired, slept all day yesterday, and tired today.

## 2025-01-17 DIAGNOSIS — R30.0 DYSURIA: ICD-10-CM

## 2025-01-18 ENCOUNTER — LAB (OUTPATIENT)
Dept: LAB | Facility: LAB | Age: OVER 89
End: 2025-01-18
Payer: MEDICARE

## 2025-01-18 DIAGNOSIS — R30.0 DYSURIA: ICD-10-CM

## 2025-01-18 LAB
AMORPH CRY #/AREA UR COMP ASSIST: ABNORMAL /HPF
APPEARANCE UR: ABNORMAL
BACTERIA #/AREA URNS AUTO: ABNORMAL /HPF
BILIRUB UR STRIP.AUTO-MCNC: ABNORMAL MG/DL
COLOR UR: ABNORMAL
GLUCOSE UR STRIP.AUTO-MCNC: NORMAL MG/DL
HOLD SPECIMEN: NORMAL
KETONES UR STRIP.AUTO-MCNC: NEGATIVE MG/DL
LEUKOCYTE ESTERASE UR QL STRIP.AUTO: ABNORMAL
NITRITE UR QL STRIP.AUTO: ABNORMAL
PH UR STRIP.AUTO: 7 [PH]
PROT UR STRIP.AUTO-MCNC: NEGATIVE MG/DL
RBC # UR STRIP.AUTO: NEGATIVE /UL
RBC #/AREA URNS AUTO: ABNORMAL /HPF
SP GR UR STRIP.AUTO: 1.01
SQUAMOUS #/AREA URNS AUTO: ABNORMAL /HPF
TRANS CELLS #/AREA UR COMP ASSIST: ABNORMAL /HPF
UROBILINOGEN UR STRIP.AUTO-MCNC: ABNORMAL MG/DL
WBC #/AREA URNS AUTO: >50 /HPF
WBC CLUMPS #/AREA URNS AUTO: ABNORMAL /HPF

## 2025-01-18 PROCEDURE — 87186 SC STD MICRODIL/AGAR DIL: CPT

## 2025-01-18 PROCEDURE — 87086 URINE CULTURE/COLONY COUNT: CPT

## 2025-01-18 PROCEDURE — 81001 URINALYSIS AUTO W/SCOPE: CPT

## 2025-01-19 DIAGNOSIS — N39.0 ACUTE UTI: ICD-10-CM

## 2025-01-19 LAB — BACTERIA UR CULT: ABNORMAL

## 2025-01-20 ENCOUNTER — TELEPHONE (OUTPATIENT)
Dept: NEUROLOGY | Facility: CLINIC | Age: OVER 89
End: 2025-01-20
Payer: MEDICARE

## 2025-01-20 LAB — BACTERIA UR CULT: ABNORMAL

## 2025-01-20 RX ORDER — NITROFURANTOIN 25; 75 MG/1; MG/1
CAPSULE ORAL
Qty: 10 CAPSULE | Refills: 2 | Status: SHIPPED | OUTPATIENT
Start: 2025-01-20

## 2025-01-20 NOTE — TELEPHONE ENCOUNTER
Patient's daughter, Brittney called for results of urine culture. Currently on Macrobid bid since Saturday. I advised culture is susceptible to Macrobid. She reports Tracey is very tired today.

## 2025-02-07 ENCOUNTER — TELEPHONE (OUTPATIENT)
Dept: OBSTETRICS AND GYNECOLOGY | Facility: CLINIC | Age: OVER 89
End: 2025-02-07
Payer: MEDICARE

## 2025-02-07 NOTE — TELEPHONE ENCOUNTER
Left voicemail to call the office regarding 2/21/25 appointment. Appointment must be rescheduled. Provider is out of the office.

## 2025-02-21 ENCOUNTER — APPOINTMENT (OUTPATIENT)
Dept: OBSTETRICS AND GYNECOLOGY | Facility: CLINIC | Age: OVER 89
End: 2025-02-21
Payer: MEDICARE

## 2025-02-27 ENCOUNTER — TELEPHONE (OUTPATIENT)
Dept: HEMATOLOGY/ONCOLOGY | Facility: CLINIC | Age: OVER 89
End: 2025-02-27
Payer: MEDICARE

## 2025-02-27 DIAGNOSIS — N39.0 RECURRENT UTI: ICD-10-CM

## 2025-02-27 NOTE — TELEPHONE ENCOUNTER
"Brittney called office. Reports that patient was up 4 times during the night voiding. C/O feeling of \"gripping sensation\" when she voids. Taking Urised. Requests order for urinalysis, in case sx don't improve. Order place for Urinalysis/ Culture if indicated for lab collect. Brittney will all us if she takes urine in to hospital lab.   "

## 2025-03-06 NOTE — PROGRESS NOTES
Telemedicine Visit - Urogynecology    A telephone visit (audio only) between the patient (at the originating site) and provider (at the distant site) was utilized to provide this telehealth service    Verbal consent was obtained from Tracey Unger on this date 03/06/25 for a telehealth visit.  The patient's identity was confirmed and that she is located in [place]. Herminia Latif MD identified herself and the patient consented to a telehealth visit today.    HISTORY OF PRESENT ILLNESS:  Tracey Unger is a 89 y.o. female, here today for a virtual visit in follow up for Macarena, possible OAB versus anxiety component.      During last encounter on 8/22/24, reviewed and agreed to the following:  Tracey Unger is a 89 y.o. who presents in follow up for Macarena, possible OAB versus anxiety component     Macarena, vaginal atrophy  - continue methenamine and vaginal estrogen  - refill for estrogen provided today  - previously tried discontinuing methenamine but UTI recurred so will continue as she has been taking  - standing order placed for urine culture as this is easier for them on weekends, etc     RTC 6 months    Today she reports   Has had a couple infections over the past few months. Hasn't been doing as much recently, spending more time in bed these days. Still using estrogen and methenamine (needs refill)    The following were reviewed to gain additional history:  External notes: Dr. Bray follow up of chronic conditions 12/18/24  Test results:     Lab Results   Component Value Date    URINECULTURE >=100,000 CFU/mL Escherichia coli (A) 01/18/2025    URINECULTURE 20,000 - 80,000 CFU/mL Escherichia coli (A) 12/10/2024    URINECULTURE 20,000 - 80,000 Escherichia coli (A) 10/25/2024    URINECULTURE No significant growth 10/03/2024    URINECULTURE Normal genitourinary justin 06/04/2024               IMPRESSION AND PLAN:  89 y.o. with Macarena, OAB and possible anxiety component     Macarena, vaginal atrophy  - continue methenamine  and vaginal estrogen, refill placed for methenamine  - couple positive cultures recently  - reviewed option of adding D-mannose, cranberry supplements but Brittney is hesitant to start yet another medication for her    All questions and concerns were answered and addressed.  The patient expressed understanding and agrees with the plan.     RTC 6 months for virtual visit    Herminia Latif MD

## 2025-03-07 ENCOUNTER — APPOINTMENT (OUTPATIENT)
Dept: OBSTETRICS AND GYNECOLOGY | Facility: CLINIC | Age: OVER 89
End: 2025-03-07
Payer: MEDICARE

## 2025-03-07 DIAGNOSIS — N39.0 RECURRENT UTI: Primary | ICD-10-CM

## 2025-03-07 RX ORDER — METHENAMINE HIPPURATE 1000 MG/1
1 TABLET ORAL 2 TIMES DAILY
Qty: 60 TABLET | Refills: 11 | Status: SHIPPED | OUTPATIENT
Start: 2025-03-07 | End: 2026-03-07

## 2025-03-07 SDOH — ECONOMIC STABILITY: FOOD INSECURITY: WITHIN THE PAST 12 MONTHS, YOU WORRIED THAT YOUR FOOD WOULD RUN OUT BEFORE YOU GOT MONEY TO BUY MORE.: NEVER TRUE

## 2025-03-07 SDOH — ECONOMIC STABILITY: FOOD INSECURITY: WITHIN THE PAST 12 MONTHS, THE FOOD YOU BOUGHT JUST DIDN'T LAST AND YOU DIDN'T HAVE MONEY TO GET MORE.: NEVER TRUE

## 2025-03-07 ASSESSMENT — LIFESTYLE VARIABLES
HOW OFTEN DO YOU HAVE SIX OR MORE DRINKS ON ONE OCCASION: NEVER
HOW MANY STANDARD DRINKS CONTAINING ALCOHOL DO YOU HAVE ON A TYPICAL DAY: PATIENT DOES NOT DRINK
SKIP TO QUESTIONS 9-10: 1
HOW OFTEN DO YOU HAVE A DRINK CONTAINING ALCOHOL: NEVER
AUDIT-C TOTAL SCORE: 0

## 2025-03-07 ASSESSMENT — PATIENT HEALTH QUESTIONNAIRE - PHQ9
1. LITTLE INTEREST OR PLEASURE IN DOING THINGS: NOT AT ALL
SUM OF ALL RESPONSES TO PHQ9 QUESTIONS 1 AND 2: 0
2. FEELING DOWN, DEPRESSED OR HOPELESS: NOT AT ALL

## 2025-03-07 ASSESSMENT — PAIN SCALES - GENERAL: PAINLEVEL_OUTOF10: 0-NO PAIN

## 2025-03-07 ASSESSMENT — ENCOUNTER SYMPTOMS
OCCASIONAL FEELINGS OF UNSTEADINESS: 1
DEPRESSION: 0
LOSS OF SENSATION IN FEET: 0

## 2025-03-07 ASSESSMENT — COLUMBIA-SUICIDE SEVERITY RATING SCALE - C-SSRS
6. HAVE YOU EVER DONE ANYTHING, STARTED TO DO ANYTHING, OR PREPARED TO DO ANYTHING TO END YOUR LIFE?: NO
1. IN THE PAST MONTH, HAVE YOU WISHED YOU WERE DEAD OR WISHED YOU COULD GO TO SLEEP AND NOT WAKE UP?: NO
2. HAVE YOU ACTUALLY HAD ANY THOUGHTS OF KILLING YOURSELF?: NO

## 2025-03-14 ENCOUNTER — APPOINTMENT (OUTPATIENT)
Dept: OBSTETRICS AND GYNECOLOGY | Facility: CLINIC | Age: OVER 89
End: 2025-03-14
Payer: MEDICARE

## 2025-05-08 ENCOUNTER — LAB (OUTPATIENT)
Dept: LAB | Facility: HOSPITAL | Age: OVER 89
End: 2025-05-08
Payer: MEDICARE

## 2025-05-08 ENCOUNTER — TELEPHONE (OUTPATIENT)
Dept: OBSTETRICS AND GYNECOLOGY | Facility: CLINIC | Age: OVER 89
End: 2025-05-08

## 2025-05-08 DIAGNOSIS — N39.0 RECURRENT UTI: ICD-10-CM

## 2025-05-08 LAB
APPEARANCE UR: ABNORMAL
BACTERIA #/AREA URNS AUTO: ABNORMAL /HPF
BILIRUB UR STRIP.AUTO-MCNC: NEGATIVE MG/DL
COLOR UR: YELLOW
GLUCOSE UR STRIP.AUTO-MCNC: NORMAL MG/DL
HOLD SPECIMEN: 293
KETONES UR STRIP.AUTO-MCNC: NEGATIVE MG/DL
LEUKOCYTE ESTERASE UR QL STRIP.AUTO: ABNORMAL
NITRITE UR QL STRIP.AUTO: NEGATIVE
PH UR STRIP.AUTO: 7 [PH]
PROT UR STRIP.AUTO-MCNC: ABNORMAL MG/DL
RBC # UR STRIP.AUTO: ABNORMAL MG/DL
RBC #/AREA URNS AUTO: >20 /HPF
SP GR UR STRIP.AUTO: 1.01
SQUAMOUS #/AREA URNS AUTO: ABNORMAL /HPF
TRANS CELLS #/AREA UR COMP ASSIST: ABNORMAL /HPF
UROBILINOGEN UR STRIP.AUTO-MCNC: NORMAL MG/DL
WBC #/AREA URNS AUTO: >50 /HPF
WBC CLUMPS #/AREA URNS AUTO: ABNORMAL /HPF

## 2025-05-08 PROCEDURE — 87186 SC STD MICRODIL/AGAR DIL: CPT

## 2025-05-08 PROCEDURE — 87077 CULTURE AEROBIC IDENTIFY: CPT

## 2025-05-08 PROCEDURE — 87086 URINE CULTURE/COLONY COUNT: CPT

## 2025-05-08 PROCEDURE — 81001 URINALYSIS AUTO W/SCOPE: CPT

## 2025-05-08 NOTE — TELEPHONE ENCOUNTER
Patients daughter, Brittney called office. Patient having pain with urination, urgency, frequency and more incontinence. Urine sample obtained and meg ltake to lab. Started Macrobid and Urised after sample obtained. Has plenty of Macrobid on hand.

## 2025-05-11 LAB — BACTERIA UR CULT: ABNORMAL

## 2025-05-15 ENCOUNTER — TELEPHONE (OUTPATIENT)
Dept: OBSTETRICS AND GYNECOLOGY | Facility: CLINIC | Age: OVER 89
End: 2025-05-15
Payer: MEDICARE

## 2025-05-15 DIAGNOSIS — N39.0 RECURRENT UTI: ICD-10-CM

## 2025-05-15 DIAGNOSIS — R30.0 DYSURIA: ICD-10-CM

## 2025-05-15 NOTE — TELEPHONE ENCOUNTER
Patient's daughter, Brittney called office. Tracey had finished the Macrobid on Monday. Now this morning is having pain with urination and urinary incontinence. Would you like her to sub,it a new urine sample to lab, or restart the Macrobid bid for 5 days again?

## 2025-05-16 LAB
APPEARANCE UR: ABNORMAL
BACTERIA #/AREA URNS HPF: ABNORMAL /HPF
BACTERIA UR CULT: ABNORMAL
BILIRUB UR QL STRIP: NEGATIVE
CAOX CRY #/AREA URNS HPF: ABNORMAL /HPF
COLOR UR: ABNORMAL
GLUCOSE UR QL STRIP: NEGATIVE
HGB UR QL STRIP: NEGATIVE
HYALINE CASTS #/AREA URNS LPF: ABNORMAL /LPF
KETONES UR QL STRIP: NEGATIVE
LEUKOCYTE ESTERASE UR QL STRIP: ABNORMAL
NITRITE UR QL STRIP: POSITIVE
PH UR STRIP: 5.5 [PH] (ref 5–8)
PROT UR QL STRIP: ABNORMAL
RBC #/AREA URNS HPF: ABNORMAL /HPF
SERVICE CMNT-IMP: ABNORMAL
SP GR UR STRIP: 1.02 (ref 1–1.03)
SQUAMOUS #/AREA URNS HPF: ABNORMAL /HPF
WBC #/AREA URNS HPF: ABNORMAL /HPF

## 2025-05-16 NOTE — TELEPHONE ENCOUNTER
Patient's daughter called office, inquiring if patient should continue to take Macrobid while waiting for UA culture results to come back. She is concerned with the weekend coming up that we won't see the results until Monday.

## 2025-05-21 LAB — BACTERIA UR CULT: NORMAL

## 2025-07-31 ENCOUNTER — TELEPHONE (OUTPATIENT)
Dept: HEMATOLOGY/ONCOLOGY | Facility: CLINIC | Age: OVER 89
End: 2025-07-31
Payer: MEDICARE

## 2025-07-31 NOTE — TELEPHONE ENCOUNTER
Jessica, Tracey's daughter, called the office and stated that when Tracey got up this afternoon, she is complaining of pain and burning with urination.  Jessica says she is having her normal symptoms for when she gets a uti.  Jessica states that Tracey was in the ER in [place] end of last week for some early cellulitis on her arm and they put her on Cephalexin.  She completed the Cephalexin on Monday.  Jessica is requesting for the lab order be sent to the [facility] [address] In [place].  Their fax # is [phone].  Jessica says she will start giving Tracey the Macrobid once she gets the urine sample dropped off.

## 2025-08-02 LAB
APPEARANCE UR: ABNORMAL
BACTERIA #/AREA URNS HPF: ABNORMAL /HPF
BACTERIA UR CULT: NORMAL
BILIRUB UR QL STRIP: NEGATIVE
CAOX CRY #/AREA URNS HPF: ABNORMAL /HPF
COLOR UR: ABNORMAL
GLUCOSE UR QL STRIP: NEGATIVE
HGB UR QL STRIP: NEGATIVE
HYALINE CASTS #/AREA URNS LPF: ABNORMAL /LPF
KETONES UR QL STRIP: NEGATIVE
LEUKOCYTE ESTERASE UR QL STRIP: ABNORMAL
NITRITE UR QL STRIP: POSITIVE
PH UR STRIP: ABNORMAL [PH]
PROT UR QL STRIP: ABNORMAL
RBC #/AREA URNS HPF: ABNORMAL /HPF
SERVICE CMNT-IMP: ABNORMAL
SP GR UR STRIP: 1.02 (ref 1–1.03)
SQUAMOUS #/AREA URNS HPF: ABNORMAL /HPF
WBC #/AREA URNS HPF: ABNORMAL /HPF

## 2025-08-05 ENCOUNTER — TELEPHONE (OUTPATIENT)
Dept: OBSTETRICS AND GYNECOLOGY | Facility: CLINIC | Age: OVER 89
End: 2025-08-05
Payer: MEDICARE

## 2025-08-05 LAB
APPEARANCE UR: ABNORMAL
BACTERIA #/AREA URNS HPF: ABNORMAL /HPF
BACTERIA UR CULT: ABNORMAL
BILIRUB UR QL STRIP: NEGATIVE
CAOX CRY #/AREA URNS HPF: ABNORMAL /HPF
COLOR UR: ABNORMAL
GLUCOSE UR QL STRIP: NEGATIVE
HGB UR QL STRIP: NEGATIVE
HYALINE CASTS #/AREA URNS LPF: ABNORMAL /LPF
KETONES UR QL STRIP: NEGATIVE
LEUKOCYTE ESTERASE UR QL STRIP: ABNORMAL
NITRITE UR QL STRIP: POSITIVE
PH UR STRIP: ABNORMAL [PH]
PROT UR QL STRIP: ABNORMAL
RBC #/AREA URNS HPF: ABNORMAL /HPF
SERVICE CMNT-IMP: ABNORMAL
SP GR UR STRIP: 1.02 (ref 1–1.03)
SQUAMOUS #/AREA URNS HPF: ABNORMAL /HPF
WBC #/AREA URNS HPF: ABNORMAL /HPF

## 2025-08-05 NOTE — TELEPHONE ENCOUNTER
Patient had submited a urine sample to [email or web address] on Friday 8/1/25, then started Macrobid. Her daughter called today and reports she is very weak, and not feeling well, doesn't feel Macrobid is helping the UTI. Urine culture is still in process, I spoke with [email or web address] lab customer service and she will reach out to pathology to see if any update.

## 2025-09-05 ENCOUNTER — APPOINTMENT (OUTPATIENT)
Dept: OBSTETRICS AND GYNECOLOGY | Facility: CLINIC | Age: OVER 89
End: 2025-09-05
Payer: MEDICARE

## 2025-09-05 ENCOUNTER — TELEMEDICINE (OUTPATIENT)
Dept: OBSTETRICS AND GYNECOLOGY | Facility: CLINIC | Age: OVER 89
End: 2025-09-05
Payer: MEDICARE

## 2025-09-05 DIAGNOSIS — K59.09 OTHER CONSTIPATION: ICD-10-CM

## 2025-09-05 DIAGNOSIS — N39.0 RECURRENT UTI: Primary | ICD-10-CM

## 2025-09-05 PROCEDURE — 1125F AMNT PAIN NOTED PAIN PRSNT: CPT | Performed by: STUDENT IN AN ORGANIZED HEALTH CARE EDUCATION/TRAINING PROGRAM

## 2025-09-05 PROCEDURE — G2211 COMPLEX E/M VISIT ADD ON: HCPCS | Performed by: STUDENT IN AN ORGANIZED HEALTH CARE EDUCATION/TRAINING PROGRAM

## 2025-09-05 PROCEDURE — 99214 OFFICE O/P EST MOD 30 MIN: CPT | Performed by: STUDENT IN AN ORGANIZED HEALTH CARE EDUCATION/TRAINING PROGRAM

## 2025-09-05 PROCEDURE — 1036F TOBACCO NON-USER: CPT | Performed by: STUDENT IN AN ORGANIZED HEALTH CARE EDUCATION/TRAINING PROGRAM

## 2025-09-05 PROCEDURE — 1157F ADVNC CARE PLAN IN RCRD: CPT | Performed by: STUDENT IN AN ORGANIZED HEALTH CARE EDUCATION/TRAINING PROGRAM

## 2025-09-05 RX ORDER — ALPRAZOLAM 0.25 MG/1
0.25 TABLET ORAL
COMMUNITY
Start: 2025-06-05

## 2025-09-05 ASSESSMENT — ENCOUNTER SYMPTOMS
DEPRESSION: 1
OCCASIONAL FEELINGS OF UNSTEADINESS: 1
LOSS OF SENSATION IN FEET: 0

## 2025-09-05 ASSESSMENT — PAIN SCALES - GENERAL: PAINLEVEL_OUTOF10: 1
